# Patient Record
Sex: MALE | Race: WHITE | NOT HISPANIC OR LATINO | URBAN - METROPOLITAN AREA
[De-identification: names, ages, dates, MRNs, and addresses within clinical notes are randomized per-mention and may not be internally consistent; named-entity substitution may affect disease eponyms.]

---

## 2019-12-23 PROBLEM — Z00.00 ENCOUNTER FOR PREVENTIVE HEALTH EXAMINATION: Status: ACTIVE | Noted: 2019-12-23

## 2020-01-13 ENCOUNTER — EMERGENCY (EMERGENCY)
Facility: HOSPITAL | Age: 55
LOS: 1 days | End: 2020-01-13
Admitting: EMERGENCY MEDICINE
Payer: COMMERCIAL

## 2020-01-13 PROCEDURE — 73030 X-RAY EXAM OF SHOULDER: CPT | Mod: 26,RT

## 2020-01-13 PROCEDURE — 99285 EMERGENCY DEPT VISIT HI MDM: CPT

## 2020-01-13 PROCEDURE — 71046 X-RAY EXAM CHEST 2 VIEWS: CPT | Mod: 26

## 2020-01-14 ENCOUNTER — APPOINTMENT (OUTPATIENT)
Dept: CARDIOLOGY | Facility: CLINIC | Age: 55
End: 2020-01-14
Payer: COMMERCIAL

## 2020-01-14 VITALS
OXYGEN SATURATION: 97 % | DIASTOLIC BLOOD PRESSURE: 70 MMHG | HEIGHT: 76.5 IN | BODY MASS INDEX: 30.37 KG/M2 | RESPIRATION RATE: 17 BRPM | HEART RATE: 80 BPM | SYSTOLIC BLOOD PRESSURE: 146 MMHG | WEIGHT: 252 LBS

## 2020-01-14 DIAGNOSIS — R07.89 OTHER CHEST PAIN: ICD-10-CM

## 2020-01-14 DIAGNOSIS — Z83.3 FAMILY HISTORY OF DIABETES MELLITUS: ICD-10-CM

## 2020-01-14 DIAGNOSIS — Z78.9 OTHER SPECIFIED HEALTH STATUS: ICD-10-CM

## 2020-01-14 DIAGNOSIS — M25.511 PAIN IN RIGHT SHOULDER: ICD-10-CM

## 2020-01-14 DIAGNOSIS — M54.2 CERVICALGIA: ICD-10-CM

## 2020-01-14 DIAGNOSIS — Z80.0 FAMILY HISTORY OF MALIGNANT NEOPLASM OF DIGESTIVE ORGANS: ICD-10-CM

## 2020-01-14 PROCEDURE — 99203 OFFICE O/P NEW LOW 30 MIN: CPT

## 2020-01-14 NOTE — PHYSICAL EXAM
[General Appearance - Well Developed] : well developed [Normal Appearance] : normal appearance [Well Groomed] : well groomed [General Appearance - In No Acute Distress] : no acute distress [Normal Conjunctiva] : the conjunctiva exhibited no abnormalities [General Appearance - Well Nourished] : well nourished [Normal Oral Mucosa] : normal oral mucosa [Eyelids - No Xanthelasma] : the eyelids demonstrated no xanthelasmas [No Oral Pallor] : no oral pallor [No Oral Cyanosis] : no oral cyanosis [Murmurs] : no murmurs present [Heart Rate And Rhythm] : heart rate and rhythm were normal [Heart Sounds] : normal S1 and S2 [Edema] : no peripheral edema present [Respiration, Rhythm And Depth] : normal respiratory rhythm and effort [Auscultation Breath Sounds / Voice Sounds] : lungs were clear to auscultation bilaterally [Exaggerated Use Of Accessory Muscles For Inspiration] : no accessory muscle use [Gait - Sufficient For Exercise Testing] : the gait was sufficient for exercise testing [Abnormal Walk] : normal gait [FreeTextEntry1] : pain is reproducible upon palpation under the right pectoral region [Cyanosis, Localized] : no localized cyanosis [Nail Clubbing] : no clubbing of the fingernails [] : no rash [Skin Turgor] : normal skin turgor [Skin Color & Pigmentation] : normal skin color and pigmentation [Impaired Insight] : insight and judgment were intact [Affect] : the affect was normal [Memory Recent] : recent memory was not impaired

## 2020-01-14 NOTE — HISTORY OF PRESENT ILLNESS
[FreeTextEntry1] : 54 year old male, retired , no PMHx, developed chest pain yesterday while cooking dinner. It is right sided made worse with movement and breathing in. Also made worse with palpation of the affected area and moving his arms across his chest. It is still ongoing. He has an injured right shoulder. Has associated right neck soreness as well. The pain is described as sharp. Has some mild SOB with it, however hurts when he takes a breath in. Went to [facility] ED yesterday. Negative troponin, normal chest X-Ray and ECG. Patient is very active. Exercised by going to they gym and doing bike, treadmill, lifts weights. Doesn't recall a particular injury. Hasn't lifted weights last few days. No exertional chest pain or SOB.

## 2020-01-14 NOTE — DISCUSSION/SUMMARY
[FreeTextEntry1] : 1. Chest Pain: musculoskeletal in origin. Pain is reproducible upon palpating the affected area. Recommend NSAIDs and rest. Patient should lift weights for at least 2 weeks until pain resolves. No need for cardiac testing at this point in time. \par \par Patient can follow up in 3 months.

## 2020-01-14 NOTE — REVIEW OF SYSTEMS
[Shortness Of Breath] : no shortness of breath [Dyspnea on exertion] : not dyspnea during exertion [Palpitations] : no palpitations [Chest Pain] : chest pain [Lower Ext Edema] : no extremity edema [see HPI] : see HPI [Negative] : Heme/Lymph

## 2020-04-06 ENCOUNTER — APPOINTMENT (OUTPATIENT)
Dept: CARDIOLOGY | Facility: CLINIC | Age: 55
End: 2020-04-06

## 2022-06-14 ENCOUNTER — EMERGENCY (EMERGENCY)
Facility: HOSPITAL | Age: 57
LOS: 1 days | Discharge: HOME | End: 2022-06-14
Admitting: EMERGENCY MEDICINE
Payer: COMMERCIAL

## 2022-06-14 DIAGNOSIS — R10.31 RIGHT LOWER QUADRANT PAIN: ICD-10-CM

## 2022-06-14 DIAGNOSIS — K57.32 DIVERTICULITIS OF LARGE INTESTINE WITHOUT PERFORATION OR ABSCESS WITHOUT BLEEDING: ICD-10-CM

## 2022-06-14 DIAGNOSIS — R10.33 PERIUMBILICAL PAIN: ICD-10-CM

## 2022-06-14 DIAGNOSIS — R10.11 RIGHT UPPER QUADRANT PAIN: ICD-10-CM

## 2022-06-14 PROCEDURE — 99285 EMERGENCY DEPT VISIT HI MDM: CPT

## 2022-06-15 PROCEDURE — 74176 CT ABD & PELVIS W/O CONTRAST: CPT | Mod: 26,ME

## 2022-06-15 PROCEDURE — G1004: CPT

## 2022-06-15 PROCEDURE — 76705 ECHO EXAM OF ABDOMEN: CPT | Mod: 26

## 2024-03-16 ENCOUNTER — RESULT REVIEW (OUTPATIENT)
Age: 59
End: 2024-03-16

## 2024-03-16 ENCOUNTER — TRANSCRIPTION ENCOUNTER (OUTPATIENT)
Age: 59
End: 2024-03-16

## 2024-03-17 ENCOUNTER — TRANSCRIPTION ENCOUNTER (OUTPATIENT)
Age: 59
End: 2024-03-17

## 2024-03-25 RX ORDER — KETOCONAZOLE 20 MG/G
2 CREAM TOPICAL
Qty: 30 | Refills: 0 | Status: DISCONTINUED | COMMUNITY
Start: 2020-01-03 | End: 2024-03-25

## 2024-03-25 RX ORDER — COLD-HOT PACK
EACH MISCELLANEOUS DAILY
Refills: 0 | Status: DISCONTINUED | COMMUNITY
End: 2024-03-25

## 2024-04-02 ENCOUNTER — APPOINTMENT (OUTPATIENT)
Dept: CARDIOLOGY | Facility: CLINIC | Age: 59
End: 2024-04-02

## 2024-04-03 ENCOUNTER — APPOINTMENT (OUTPATIENT)
Dept: NEUROLOGY | Facility: CLINIC | Age: 59
End: 2024-04-03
Payer: COMMERCIAL

## 2024-04-03 VITALS
SYSTOLIC BLOOD PRESSURE: 162 MMHG | WEIGHT: 248 LBS | BODY MASS INDEX: 29.89 KG/M2 | HEIGHT: 76.5 IN | RESPIRATION RATE: 14 BRPM | TEMPERATURE: 98 F | DIASTOLIC BLOOD PRESSURE: 82 MMHG | OXYGEN SATURATION: 98 % | HEART RATE: 75 BPM

## 2024-04-03 DIAGNOSIS — R41.3 OTHER AMNESIA: ICD-10-CM

## 2024-04-03 PROCEDURE — 99205 OFFICE O/P NEW HI 60 MIN: CPT

## 2024-04-03 RX ORDER — ASPIRIN 81 MG
81 TABLET, DELAYED RELEASE (ENTERIC COATED) ORAL DAILY
Refills: 0 | Status: ACTIVE | COMMUNITY

## 2024-04-03 RX ORDER — ATORVASTATIN CALCIUM 40 MG/1
40 TABLET, FILM COATED ORAL
Refills: 0 | Status: ACTIVE | COMMUNITY

## 2024-04-03 RX ORDER — LABETALOL HYDROCHLORIDE 100 MG/1
100 TABLET, FILM COATED ORAL
Refills: 0 | Status: ACTIVE | COMMUNITY

## 2024-04-03 RX ORDER — CLOPIDOGREL BISULFATE 75 MG/1
75 TABLET, FILM COATED ORAL DAILY
Qty: 30 | Refills: 2 | Status: ACTIVE | COMMUNITY
Start: 2024-04-03 | End: 1900-01-01

## 2024-04-03 RX ORDER — KETOCONAZOLE 20 MG/G
2 CREAM TOPICAL
Refills: 0 | Status: ACTIVE | COMMUNITY

## 2024-04-03 NOTE — ASSESSMENT
[FreeTextEntry1] : 58-year-old male with hypertension, hyperlipidemia, [id number] admission at [facility] [place] for transient anterograde memory loss with reported MRI being negative and discharged with diagnosis of possible TGA on aspirin and atorvastatin.  Upon further review of the MRI I note 2 punctate foci of diffusion restriction with positive ADC correlate in the left mesial temporal lobe/hippocampal region consistent with acute stroke etiology likely ESUS (embolic). There is additional small circular anomaly in the right caudate head region which is uncertain if it is chronic stroke versus a cystic lesion would clarify with contrast enhanced MRI for r/o slow growing tumor.  TTE in hospital was without acute findings.  PLAN: - Continue with ASA 81 daily - Start Plavix 75 mg daily x 3 months - Continue with atorvastatin 44 LDL 69 at the hospital. - c/w BP meds for now  -Obtain MRI brain with and without contrast - Will check MRA head/neck to evaluate for steno-occlusive disease intracranially and extracranially.   -Cardiology referral.  Patient is already scheduled to see Dr. Padron coming friday   - would need cardioembolic w/u - including MERYL   - prolonged cardiac monitoring such as ILR should be considered -Stroke education, counseling provided.  Importance of aggressive vascular risk factor modification emphasized. - Advised pt to keep BP diary TID x 3 weeks and extra measurements when feeling dizzy, lightheaded. bring to next appt and to cardio.  #Snoring - r/o DEIDRA - sleep referral given  RTC in 3 months or PRN sooner  Extensive education and counseling done as per my usual protocol - relevant to the neurological issues above. Patient's and wife's questions and concerns were addressed, they voiced understanding.  Total time spent on the day of the visit, including pre-visit and post-visit time was 62 minutes.

## 2024-04-03 NOTE — REASON FOR VISIT
[Post Hospitalization] : a post hospitalization visit [Spouse] : spouse [FreeTextEntry1] : confusional episode, TIA

## 2024-04-03 NOTE — PHYSICAL EXAM
[FreeTextEntry1] : NEUROLOGIC EXAM:  MENTAL STATUS: Alert and Oriented to person, place and time. Speech is fluent, without aphasia or dysarthria. Able to name, repeat and follow commands. Behavior and affect appropriate to situation.                        CRANIAL NERVES: CN 2:    Visual fields appear full to confrontation OU CN 3, 4, 6: Extraocular movements are intact. No nystagmus or ophthalmoplegia is evident. Pupils are equally round CN 5:     Facial sensation is intact to light touch in all 3 divisions CN 7:     Facial excursion is full and symmetric bilaterally.  MOTOR: Bilateral upper extremities are antigravity without orbiting or drift. bilateral lower extremities are full range of motion without drift.  SENSORY: Intact to light touch perception in all four extremities  COORD: Finger to nose testing without dysmetria bilaterally.  GAIT: Normal station and gait. [Over the Past 2 Weeks, Have You Felt Down, Depressed, or Hopeless?] : 1.) Over the past 2 weeks, have you felt down, depressed, or hopeless? No [Over the Past 2 Weeks, Have You Felt Little Interest or Pleasure Doing Things?] : 2.) Over the past 2 weeks, have you felt little interest or pleasure doing things? No

## 2024-04-04 ENCOUNTER — RESULT REVIEW (OUTPATIENT)
Age: 59
End: 2024-04-04

## 2024-04-05 ENCOUNTER — APPOINTMENT (OUTPATIENT)
Dept: CARDIOLOGY | Facility: CLINIC | Age: 59
End: 2024-04-05
Payer: COMMERCIAL

## 2024-04-05 VITALS
DIASTOLIC BLOOD PRESSURE: 86 MMHG | HEART RATE: 64 BPM | WEIGHT: 248 LBS | HEIGHT: 76 IN | OXYGEN SATURATION: 96 % | SYSTOLIC BLOOD PRESSURE: 150 MMHG | BODY MASS INDEX: 30.2 KG/M2

## 2024-04-05 DIAGNOSIS — Z79.899 OTHER LONG TERM (CURRENT) DRUG THERAPY: ICD-10-CM

## 2024-04-05 PROCEDURE — 99205 OFFICE O/P NEW HI 60 MIN: CPT

## 2024-04-05 PROCEDURE — 93228 REMOTE 30 DAY ECG REV/REPORT: CPT

## 2024-04-05 PROCEDURE — G2211 COMPLEX E/M VISIT ADD ON: CPT

## 2024-04-05 RX ORDER — AMLODIPINE BESYLATE 5 MG/1
5 TABLET ORAL
Qty: 180 | Refills: 3 | Status: ACTIVE | COMMUNITY
Start: 1900-01-01 | End: 1900-01-01

## 2024-04-05 NOTE — CARDIOLOGY SUMMARY
[de-identified] : 3/15/2024, NSR with PVC [de-identified] : 3/16/2024, LV EF 55-60%, normal LV diastolic function, mild MR, mild TR with estimated PASP of 39mmHg.

## 2024-04-05 NOTE — DISCUSSION/SUMMARY
[FreeTextEntry1] : 1. Ischemic CVA: thought to be embolic by neurology based upon imaging. Advising MERYL and 30 Day event monitor. Recommend Aspirin 81mg daily, Plavix 75mg daily, atorvastatin 40mg daily. Goal LDL less than 70 and goal BP less than 130/80.  2. Carotid Atherosclerosis: not hemodynamically significant. Continue Aspirin 81mg daily and atorvastatin 40mg daily.  3. HTN: on amlodipine 5mg daily and Labetalol 100mg BID (high risk medication with no signs of toxicity). Remains elevated. Advise increasing amlodipine to 5mg BID.  4. HLD: goal LDL less than 70. Continue atorvastatin 40mg daily.  Follow up in 6 weeks.

## 2024-04-05 NOTE — PHYSICAL EXAM
[Normal] : moves all extremities, no focal deficits, normal speech [de-identified] :  No carotid bruits auscultated bilaterally.

## 2024-04-05 NOTE — HISTORY OF PRESENT ILLNESS
[FreeTextEntry1] : 58 year old male presents after having ischemic CVA, middle March 2024. Patient was evaluated and treated at [facility] [place]. Patient was working out in the gym (bench pressing 250lbs) when suddenly a friend noticed he became very confused could not remember things. Taken to the ED. CT head in the ER did not reveal any acute findings. CTA raises a concern for possible carotid atherosclerosis of about 50% on the right however was a poor quality study technically. CTP was thought to be artifactual.

## 2024-04-12 ENCOUNTER — RESULT REVIEW (OUTPATIENT)
Age: 59
End: 2024-04-12

## 2024-04-18 ENCOUNTER — APPOINTMENT (OUTPATIENT)
Dept: MRI IMAGING | Facility: CLINIC | Age: 59
End: 2024-04-18
Payer: COMMERCIAL

## 2024-04-18 ENCOUNTER — APPOINTMENT (OUTPATIENT)
Dept: CARDIOLOGY | Facility: CLINIC | Age: 59
End: 2024-04-18
Payer: COMMERCIAL

## 2024-04-18 VITALS
DIASTOLIC BLOOD PRESSURE: 90 MMHG | WEIGHT: 248 LBS | OXYGEN SATURATION: 98 % | HEIGHT: 76 IN | SYSTOLIC BLOOD PRESSURE: 146 MMHG | HEART RATE: 70 BPM | BODY MASS INDEX: 30.2 KG/M2

## 2024-04-18 DIAGNOSIS — G93.0 CEREBRAL CYSTS: ICD-10-CM

## 2024-04-18 DIAGNOSIS — E78.00 PURE HYPERCHOLESTEROLEMIA, UNSPECIFIED: ICD-10-CM

## 2024-04-18 DIAGNOSIS — C44.90 UNSPECIFIED MALIGNANT NEOPLASM OF SKIN, UNSPECIFIED: ICD-10-CM

## 2024-04-18 DIAGNOSIS — I65.29 OCCLUSION AND STENOSIS OF UNSPECIFIED CAROTID ARTERY: ICD-10-CM

## 2024-04-18 DIAGNOSIS — I10 ESSENTIAL (PRIMARY) HYPERTENSION: ICD-10-CM

## 2024-04-18 PROCEDURE — 70546 MR ANGIOGRAPH HEAD W/O&W/DYE: CPT

## 2024-04-18 PROCEDURE — G2211 COMPLEX E/M VISIT ADD ON: CPT

## 2024-04-18 PROCEDURE — 99215 OFFICE O/P EST HI 40 MIN: CPT

## 2024-04-18 PROCEDURE — 70545 MR ANGIOGRAPHY HEAD W/DYE: CPT | Mod: 59

## 2024-04-18 NOTE — CARDIOLOGY SUMMARY
[de-identified] : 3/15/2024, NSR with PVC [de-identified] : 4/12/2024, MERYL: Small PFO  3/16/2024, LV EF 55-60%, normal LV diastolic function, mild MR, mild TR with estimated PASP of 39mmHg.

## 2024-04-18 NOTE — PHYSICAL EXAM
[Normal] : moves all extremities, no focal deficits, normal speech [de-identified] :  No carotid bruits auscultated bilaterally.

## 2024-04-18 NOTE — DISCUSSION/SUMMARY
[FreeTextEntry1] : 1. Ischemic CVA: thought to be embolic by neurology based upon imaging. PFO confirmed on MERYL, 4/12/2024.  30 Day event monitor pending as well as sleep study ordered by neurology. Recommend Aspirin 81mg daily, Plavix 75mg daily, atorvastatin 40mg daily. Goal LDL less than 70 and goal BP less than 130/80.  2. Carotid Atherosclerosis: not hemodynamically significant. Continue Aspirin 81mg daily and atorvastatin 40mg daily.  3. HTN: on amlodipine 5mg BID and Labetalol 100mg BID (high risk medication with no signs of toxicity). Remains elevated.   4. HLD: goal LDL less than 70. Continue atorvastatin 40mg daily.  Will refer to Dr. Castanon for percutaneous closure of PFO consideration.  Follow up in 6 months. (4) rarely moist

## 2024-04-18 NOTE — HISTORY OF PRESENT ILLNESS
[FreeTextEntry1] : 58 year old male presents after having ischemic CVA, middle March 2024. Patient was evaluated and [place] [facility] [place] Patient was working out in the gym (bench pressing 250lbs) when suddenly a friend noticed he became very confused could not remember things. Taken to the ED. CT head in the ER did not reveal any acute findings. CTA raises a concern for possible carotid atherosclerosis of about 50% on the right however was a poor quality study technically. CTP was thought to be artifactual.

## 2024-04-22 ENCOUNTER — APPOINTMENT (OUTPATIENT)
Dept: MRI IMAGING | Facility: CLINIC | Age: 59
End: 2024-04-22
Payer: COMMERCIAL

## 2024-04-22 PROCEDURE — 70549 MR ANGIOGRAPH NECK W/O&W/DYE: CPT

## 2024-04-22 PROCEDURE — A9585: CPT | Mod: JW

## 2024-04-24 ENCOUNTER — APPOINTMENT (OUTPATIENT)
Dept: MRI IMAGING | Facility: CLINIC | Age: 59
End: 2024-04-24
Payer: COMMERCIAL

## 2024-04-24 PROCEDURE — 70553 MRI BRAIN STEM W/O & W/DYE: CPT

## 2024-05-06 ENCOUNTER — APPOINTMENT (OUTPATIENT)
Dept: CARDIOLOGY | Facility: CLINIC | Age: 59
End: 2024-05-06
Payer: COMMERCIAL

## 2024-05-06 VITALS
RESPIRATION RATE: 16 BRPM | SYSTOLIC BLOOD PRESSURE: 171 MMHG | HEART RATE: 70 BPM | HEIGHT: 76 IN | OXYGEN SATURATION: 99 % | DIASTOLIC BLOOD PRESSURE: 88 MMHG

## 2024-05-06 DIAGNOSIS — I63.432 CEREBRAL INFARCTION DUE TO EMBOLISM OF LEFT POSTERIOR CEREBRAL ARTERY: ICD-10-CM

## 2024-05-06 DIAGNOSIS — Q24.8 OTHER SPECIFIED CONGENITAL MALFORMATIONS OF HEART: ICD-10-CM

## 2024-05-06 DIAGNOSIS — Q21.12 PATENT FORAMEN OVALE: ICD-10-CM

## 2024-05-06 PROCEDURE — 99204 OFFICE O/P NEW MOD 45 MIN: CPT

## 2024-05-06 PROCEDURE — 93000 ELECTROCARDIOGRAM COMPLETE: CPT

## 2024-05-06 PROCEDURE — G2211 COMPLEX E/M VISIT ADD ON: CPT

## 2024-05-06 NOTE — DISCUSSION/SUMMARY
[FreeTextEntry1] : Patient's MERYL was reviewed.  A hypermobile atrial septum is present.  Bubble study reveals 20-30 bubbles crossing a PFO with bubble injection.  The results of his long term monitoring will be analyzed in the next two weeks.  Assuming no occult atrial fibrillation is found, will recommend PFO closure.  The procedure was explained to the patient, who agrees. [EKG obtained to assist in diagnosis and management of assessed problem(s)] : EKG obtained to assist in diagnosis and management of assessed problem(s)

## 2024-05-06 NOTE — HISTORY OF PRESENT ILLNESS
[FreeTextEntry1] : 58 year old man in generally good health. in march while lifting weights at the gym, he suffered a neurological event.  Initially thought to be global amnesia, review by neurology identified a probably embolic stroke.  Echocardiogram identified a PFO with right to left shunt on bubble study.  He is currently undergoiong long term monitoring for AF.  he has had no cardiac symptoms such as SOB, chest discomfort or aplpitations.

## 2024-05-08 ENCOUNTER — OUTPATIENT (OUTPATIENT)
Dept: OUTPATIENT SERVICES | Facility: HOSPITAL | Age: 59
LOS: 1 days | End: 2024-05-08
Payer: COMMERCIAL

## 2024-05-08 DIAGNOSIS — G47.33 OBSTRUCTIVE SLEEP APNEA (ADULT) (PEDIATRIC): ICD-10-CM

## 2024-05-08 PROCEDURE — 95810 POLYSOM 6/> YRS 4/> PARAM: CPT | Mod: 26

## 2024-05-08 PROCEDURE — 95810 POLYSOM 6/> YRS 4/> PARAM: CPT

## 2024-07-23 ENCOUNTER — APPOINTMENT (OUTPATIENT)
Dept: NEUROLOGY | Facility: CLINIC | Age: 59
End: 2024-07-23

## 2024-07-26 ENCOUNTER — APPOINTMENT (OUTPATIENT)
Dept: PULMONOLOGY | Facility: CLINIC | Age: 59
End: 2024-07-26
Payer: COMMERCIAL

## 2024-07-26 VITALS
WEIGHT: 250 LBS | SYSTOLIC BLOOD PRESSURE: 132 MMHG | RESPIRATION RATE: 16 BRPM | BODY MASS INDEX: 30.44 KG/M2 | HEART RATE: 82 BPM | OXYGEN SATURATION: 96 % | HEIGHT: 76 IN | DIASTOLIC BLOOD PRESSURE: 80 MMHG

## 2024-07-26 DIAGNOSIS — G47.19 OTHER HYPERSOMNIA: ICD-10-CM

## 2024-07-26 DIAGNOSIS — G47.33 OBSTRUCTIVE SLEEP APNEA (ADULT) (PEDIATRIC): ICD-10-CM

## 2024-07-26 DIAGNOSIS — R06.83 SNORING: ICD-10-CM

## 2024-07-26 PROCEDURE — 99213 OFFICE O/P EST LOW 20 MIN: CPT

## 2024-07-26 NOTE — CONSULT LETTER
[Dear  ___] : Dear  [unfilled], [Consult Letter:] : I had the pleasure of evaluating your patient, [unfilled]. [Please see my note below.] : Please see my note below. [Consult Closing:] : Thank you very much for allowing me to participate in the care of this patient.  If you have any questions, please do not hesitate to contact me. [Sincerely,] : Sincerely, [DrJarrell  ___] : Dr. MEJIA

## 2024-07-26 NOTE — HISTORY OF PRESENT ILLNESS
[TextBox_4] : 7/26/24  50-year-old obese male Snoring  Excessive daytime somnolence HTN Embolic CVA (while bench pressing 250 lbs) - PFO (awaiting closure) Non-smoker  for [employer] in [facility] Claustrophobic Doesn't believe he could tolerate CPAP

## 2024-07-26 NOTE — PHYSICAL EXAM
[No Acute Distress] : no acute distress [Elongated Uvula] : elongated uvula [Enlarged Base of the Tongue] : enlarged base of the tongue [Retrognathia] : retrognathia [II] : Mallampati Class: II [Normal Appearance] : normal appearance [No Neck Mass] : no neck mass [Normal Rate/Rhythm] : normal rate/rhythm [Normal S1, S2] : normal s1, s2 [No Resp Distress] : no resp distress [Clear to Auscultation Bilaterally] : clear to auscultation bilaterally [No Clubbing] : no clubbing [No Cyanosis] : no cyanosis [No Edema] : no edema

## 2024-08-28 ENCOUNTER — TRANSCRIPTION ENCOUNTER (OUTPATIENT)
Age: 59
End: 2024-08-28

## 2024-08-28 ENCOUNTER — RESULT REVIEW (OUTPATIENT)
Age: 59
End: 2024-08-28

## 2024-08-28 ENCOUNTER — OUTPATIENT (OUTPATIENT)
Dept: OUTPATIENT SERVICES | Facility: HOSPITAL | Age: 59
LOS: 1 days | End: 2024-08-28
Payer: COMMERCIAL

## 2024-08-28 VITALS
RESPIRATION RATE: 17 BRPM | OXYGEN SATURATION: 98 % | SYSTOLIC BLOOD PRESSURE: 153 MMHG | DIASTOLIC BLOOD PRESSURE: 97 MMHG | HEART RATE: 59 BPM

## 2024-08-28 VITALS
OXYGEN SATURATION: 98 % | DIASTOLIC BLOOD PRESSURE: 94 MMHG | WEIGHT: 253.97 LBS | SYSTOLIC BLOOD PRESSURE: 153 MMHG | HEART RATE: 65 BPM | TEMPERATURE: 97 F | RESPIRATION RATE: 13 BRPM | HEIGHT: 76 IN

## 2024-08-28 DIAGNOSIS — Z98.890 OTHER SPECIFIED POSTPROCEDURAL STATES: Chronic | ICD-10-CM

## 2024-08-28 DIAGNOSIS — Q21.12 PATENT FORAMEN OVALE: ICD-10-CM

## 2024-08-28 LAB
ANION GAP SERPL CALC-SCNC: 10 MMOL/L — SIGNIFICANT CHANGE UP (ref 5–17)
BASOPHILS # BLD AUTO: 0.05 K/UL — SIGNIFICANT CHANGE UP (ref 0–0.2)
BASOPHILS NFR BLD AUTO: 0.7 % — SIGNIFICANT CHANGE UP (ref 0–2)
BLD GP AB SCN SERPL QL: SIGNIFICANT CHANGE UP
BUN SERPL-MCNC: 15.8 MG/DL — SIGNIFICANT CHANGE UP (ref 8–20)
CALCIUM SERPL-MCNC: 9.1 MG/DL — SIGNIFICANT CHANGE UP (ref 8.4–10.5)
CHLORIDE SERPL-SCNC: 107 MMOL/L — SIGNIFICANT CHANGE UP (ref 96–108)
CO2 SERPL-SCNC: 23 MMOL/L — SIGNIFICANT CHANGE UP (ref 22–29)
CREAT SERPL-MCNC: 0.98 MG/DL — SIGNIFICANT CHANGE UP (ref 0.5–1.3)
EGFR: 89 ML/MIN/1.73M2 — SIGNIFICANT CHANGE UP
EOSINOPHIL # BLD AUTO: 0.18 K/UL — SIGNIFICANT CHANGE UP (ref 0–0.5)
EOSINOPHIL NFR BLD AUTO: 2.4 % — SIGNIFICANT CHANGE UP (ref 0–6)
GLUCOSE SERPL-MCNC: 124 MG/DL — HIGH (ref 70–99)
HCT VFR BLD CALC: 44.4 % — SIGNIFICANT CHANGE UP (ref 39–50)
HGB BLD-MCNC: 15.1 G/DL — SIGNIFICANT CHANGE UP (ref 13–17)
IMM GRANULOCYTES NFR BLD AUTO: 0.4 % — SIGNIFICANT CHANGE UP (ref 0–0.9)
LYMPHOCYTES # BLD AUTO: 1.27 K/UL — SIGNIFICANT CHANGE UP (ref 1–3.3)
LYMPHOCYTES # BLD AUTO: 16.6 % — SIGNIFICANT CHANGE UP (ref 13–44)
MAGNESIUM SERPL-MCNC: 2.1 MG/DL — SIGNIFICANT CHANGE UP (ref 1.6–2.6)
MCHC RBC-ENTMCNC: 31.1 PG — SIGNIFICANT CHANGE UP (ref 27–34)
MCHC RBC-ENTMCNC: 34 GM/DL — SIGNIFICANT CHANGE UP (ref 32–36)
MCV RBC AUTO: 91.5 FL — SIGNIFICANT CHANGE UP (ref 80–100)
MONOCYTES # BLD AUTO: 0.48 K/UL — SIGNIFICANT CHANGE UP (ref 0–0.9)
MONOCYTES NFR BLD AUTO: 6.3 % — SIGNIFICANT CHANGE UP (ref 2–14)
NEUTROPHILS # BLD AUTO: 5.62 K/UL — SIGNIFICANT CHANGE UP (ref 1.8–7.4)
NEUTROPHILS NFR BLD AUTO: 73.6 % — SIGNIFICANT CHANGE UP (ref 43–77)
PLATELET # BLD AUTO: 232 K/UL — SIGNIFICANT CHANGE UP (ref 150–400)
POTASSIUM SERPL-MCNC: 4.6 MMOL/L — SIGNIFICANT CHANGE UP (ref 3.5–5.3)
POTASSIUM SERPL-SCNC: 4.6 MMOL/L — SIGNIFICANT CHANGE UP (ref 3.5–5.3)
RBC # BLD: 4.85 M/UL — SIGNIFICANT CHANGE UP (ref 4.2–5.8)
RBC # FLD: 12.6 % — SIGNIFICANT CHANGE UP (ref 10.3–14.5)
SODIUM SERPL-SCNC: 140 MMOL/L — SIGNIFICANT CHANGE UP (ref 135–145)
WBC # BLD: 7.63 K/UL — SIGNIFICANT CHANGE UP (ref 3.8–10.5)
WBC # FLD AUTO: 7.63 K/UL — SIGNIFICANT CHANGE UP (ref 3.8–10.5)

## 2024-08-28 PROCEDURE — 93325 DOPPLER ECHO COLOR FLOW MAPG: CPT

## 2024-08-28 PROCEDURE — 93325 DOPPLER ECHO COLOR FLOW MAPG: CPT | Mod: 26

## 2024-08-28 PROCEDURE — 86850 RBC ANTIBODY SCREEN: CPT

## 2024-08-28 PROCEDURE — C1759: CPT

## 2024-08-28 PROCEDURE — C1887: CPT

## 2024-08-28 PROCEDURE — C1766: CPT

## 2024-08-28 PROCEDURE — 83735 ASSAY OF MAGNESIUM: CPT

## 2024-08-28 PROCEDURE — 86900 BLOOD TYPING SEROLOGIC ABO: CPT

## 2024-08-28 PROCEDURE — 93308 TTE F-UP OR LMTD: CPT | Mod: 26

## 2024-08-28 PROCEDURE — 36415 COLL VENOUS BLD VENIPUNCTURE: CPT

## 2024-08-28 PROCEDURE — 99152 MOD SED SAME PHYS/QHP 5/>YRS: CPT

## 2024-08-28 PROCEDURE — C1894: CPT

## 2024-08-28 PROCEDURE — 80048 BASIC METABOLIC PNL TOTAL CA: CPT

## 2024-08-28 PROCEDURE — C1769: CPT

## 2024-08-28 PROCEDURE — 93005 ELECTROCARDIOGRAM TRACING: CPT

## 2024-08-28 PROCEDURE — 93662 INTRACARDIAC ECG (ICE): CPT

## 2024-08-28 PROCEDURE — C8924: CPT

## 2024-08-28 PROCEDURE — C1817: CPT

## 2024-08-28 PROCEDURE — 93580 TRANSCATH CLOSURE OF ASD: CPT

## 2024-08-28 PROCEDURE — 85025 COMPLETE CBC W/AUTO DIFF WBC: CPT

## 2024-08-28 PROCEDURE — 93321 DOPPLER ECHO F-UP/LMTD STD: CPT

## 2024-08-28 PROCEDURE — 93662 INTRACARDIAC ECG (ICE): CPT | Mod: 26

## 2024-08-28 PROCEDURE — 93010 ELECTROCARDIOGRAM REPORT: CPT

## 2024-08-28 PROCEDURE — 93321 DOPPLER ECHO F-UP/LMTD STD: CPT | Mod: 26

## 2024-08-28 PROCEDURE — 86901 BLOOD TYPING SEROLOGIC RH(D): CPT

## 2024-08-28 RX ORDER — CEFAZOLIN SODIUM 2 G/100ML
2000 INJECTION, SOLUTION INTRAVENOUS ONCE
Refills: 0 | Status: COMPLETED | OUTPATIENT
Start: 2024-08-28 | End: 2024-08-28

## 2024-08-28 RX ORDER — CEFAZOLIN SODIUM 2 G/100ML
2000 INJECTION, SOLUTION INTRAVENOUS ONCE
Refills: 0 | Status: DISCONTINUED | OUTPATIENT
Start: 2024-08-28 | End: 2024-08-28

## 2024-08-28 RX ORDER — ACETAMINOPHEN 325 MG/1
650 TABLET ORAL EVERY 6 HOURS
Refills: 0 | Status: DISCONTINUED | OUTPATIENT
Start: 2024-08-28 | End: 2024-09-11

## 2024-08-28 RX ORDER — SODIUM CHLORIDE 9 MG/ML
250 INJECTION INTRAMUSCULAR; INTRAVENOUS; SUBCUTANEOUS ONCE
Refills: 0 | Status: DISCONTINUED | OUTPATIENT
Start: 2024-08-28 | End: 2024-09-11

## 2024-08-28 RX ORDER — AMLODIPINE BESYLATE 10 MG/1
1 TABLET ORAL
Refills: 0 | DISCHARGE

## 2024-08-28 RX ORDER — SODIUM CHLORIDE 9 MG/ML
250 INJECTION INTRAMUSCULAR; INTRAVENOUS; SUBCUTANEOUS ONCE
Refills: 0 | Status: COMPLETED | OUTPATIENT
Start: 2024-08-28 | End: 2024-08-28

## 2024-08-28 RX ORDER — CHLORHEXIDINE GLUCONATE 40 MG/ML
1 SOLUTION TOPICAL ONCE
Refills: 0 | Status: DISCONTINUED | OUTPATIENT
Start: 2024-08-28 | End: 2024-09-11

## 2024-08-28 RX ORDER — ACETAMINOPHEN 325 MG/1
2 TABLET ORAL
Qty: 0 | Refills: 0 | DISCHARGE
Start: 2024-08-28

## 2024-08-28 RX ORDER — ASPIRIN 81 MG
0 TABLET, DELAYED RELEASE (ENTERIC COATED) ORAL
Refills: 0 | DISCHARGE

## 2024-08-28 RX ADMIN — SODIUM CHLORIDE 250 MILLILITER(S): 9 INJECTION INTRAMUSCULAR; INTRAVENOUS; SUBCUTANEOUS at 12:42

## 2024-08-28 RX ADMIN — CEFAZOLIN SODIUM 2000 MILLIGRAM(S): 2 INJECTION, SOLUTION INTRAVENOUS at 16:32

## 2024-08-28 NOTE — DISCHARGE NOTE PROVIDER - CARE PROVIDER_API CALL
Juma Padron  Cardiology  [address] [place] [address]  Phone: [phone]  Fax: [phone]  Established Patient  Follow Up Time: 1 week    Benjamin Castanon  Interventional Cardiology  [address] [place] [address]  Phone: [phone]  Fax: [phone]  Established Patient  Follow Up Time: 1 week

## 2024-08-28 NOTE — DISCHARGE NOTE PROVIDER - NSDCDCMDCOMP_GEN_ALL_CORE
[birth year]    [place] [id number]          Dear Dorita Knapp,    According to our records, you are due for your annual mammography screening. Please contact our office to obtain a mammogram order.  If this letter has been sent i
This document is complete and the patient is ready for discharge.

## 2024-08-28 NOTE — H&P PST ADULT - NSICDXFAMILYHX_GEN_ALL_CORE_FT
FAMILY HISTORY:  Father  Still living? Unknown  Family history of diabetes mellitus (DM), Age at diagnosis: Age Unknown  FH: congestive heart failure, Age at diagnosis: 61-70  FH: myocardial infarction, Age at diagnosis: 61-70

## 2024-08-28 NOTE — H&P PST ADULT - NSICDXPASTMEDICALHX_GEN_ALL_CORE_FT
PAST MEDICAL HISTORY:  CVA (cerebral vascular accident)     HLD (hyperlipidemia)     HTN (hypertension)     Skin cancer

## 2024-08-28 NOTE — H&P PST ADULT - HISTORY OF PRESENT ILLNESS
59M PMH HTN; HLD; Skin Cancer in generally good health suffered a neurologic event while lifting weights at the gym. Initially thought to be global amnesia, review by neurology team identified a probable embolic CVA. MERYL performed 4/12/24 revealing LVEF 55-60%; agitated saline reveals bubbles in the left heart consistent with small PFO; mild MR. He is currently undergoing long term monitoring for AF. He denies cardiac symptoms such as chest discomfort, shortness of breath, palpitations. He presents today at [facility] for PFO Closure with Dr. Jonnathan Castanon.     Symptoms:        Angina (Class): n/a       Ischemic Symptoms: n/a     Heart Failure:        Systolic/Diastolic/Combined: n/a       NYHA Class (within 2 weeks): n/a    Assessment of LVEF (Must be within 6 months):       EF: 55-60%       Assessed by: MERYL        Date: 4/12/24    Prior Cardiac Interventions:       PCI's (Date, Stents, Vessels): n/a       CABG (Date, Grafts): n/a    Noninvasive Testing:   Stress Test: Date: n/a    Echo (Date, Findings): MERYL performed 4/12/24 revealing LVEF 55-60%; agitated saline reveals bubbles in the left heart consistent with small PFO; mild MR.    Antianginal Therapies:        Beta Blockers:  n/a       Calcium Channel Blockers: n/a       Long Acting Nitrates: n/a       Ranexa: n/a      Associated Risk Factors:        Cerebrovascular Disease: yes CVA       Chronic Lung Disease: N/A       Peripheral Arterial Disease: N/A       Chronic Kidney Disease (if yes, what is GFR): N/A       Uncontrolled Diabetes (if yes, what is HgbA1C or FBS): N/A       Poorly Controlled Hypertension (if yes, what is SBP): N/A       Morbid Obesity (if yes, what is BMI): N/A       History of Recent Ventricular Arrhythmia: N/A       Inability to Ambulate Safely: N/A       Need for Therapeutic Anticoagulation: N/A       Antiplatelet or Contrast Allergy: N/A     59M PMH Former Smoker; HTN; HLD; Skin Cancer in generally good health suffered a neurologic event while lifting weights at the gym. He presented to [facility], CTH was negative, CTA with possible caroid atherosclerosis <50% on right but poor study. CTP ?artifact. Initially thought to be global amnesia, review by neurology team MRI ->2 punctate foci of diffusion restriction with positive ADC correlate in the left mesial temporal lobe/hippocampal region consistent with acute stroke etiology likely ESUS (embolic).. MERYL performed 4/12/24 revealing LVEF 55-60%; agitated saline reveals bubbles in the left heart consistent with small PFO; mild MR. Patient was evaluated with Holter Monitor for 28 days revealing no afib; PAC and PVC burden <1%.. He denies cardiac symptoms such as chest discomfort, shortness of breath, palpitations. He presents today at [facility] for PFO Closure with Dr. Jonnathan Castanon.     Symptoms:        Angina (Class): n/a       Ischemic Symptoms: n/a     Heart Failure:        Systolic/Diastolic/Combined: n/a       NYHA Class (within 2 weeks): n/a    Assessment of LVEF (Must be within 6 months):       EF: 55-60%       Assessed by: MERYL        Date: 4/12/24    Prior Cardiac Interventions:       PCI's (Date, Stents, Vessels): n/a       CABG (Date, Grafts): n/a    Noninvasive Testing:   Stress Test: Date: n/a    Echo (Date, Findings): MERYL performed 4/12/24 revealing LVEF 55-60%; agitated saline reveals bubbles in the left heart consistent with small PFO; mild MR.    Antianginal Therapies:        Beta Blockers:  n/a       Calcium Channel Blockers: n/a       Long Acting Nitrates: n/a       Ranexa: n/a      Associated Risk Factors:        Cerebrovascular Disease: yes CVA       Chronic Lung Disease: N/A       Peripheral Arterial Disease: N/A       Chronic Kidney Disease (if yes, what is GFR): N/A       Uncontrolled Diabetes (if yes, what is HgbA1C or FBS): N/A       Poorly Controlled Hypertension (if yes, what is SBP): N/A       Morbid Obesity (if yes, what is BMI): N/A       History of Recent Ventricular Arrhythmia: N/A       Inability to Ambulate Safely: N/A       Need for Therapeutic Anticoagulation: N/A       Antiplatelet or Contrast Allergy: N/A     59M PMH Retired ; HTN; HLD; Skin Cancer; hernia surgery; left wrist brachial artery injury s/p repair in generally good health suffered a neurologic event in March while lifting weights at the gym. He presented to [facility] [place], CTH was negative, CTA with possible caroid atherosclerosis <50% on right but poor study. CTP ?artifact. Initially thought to be global amnesia, review by neurology team MRI ->2 punctate foci of diffusion restriction with positive ADC correlate in the left mesial temporal lobe/hippocampal region consistent with acute stroke etiology likely ESUS (embolic).. MERYL performed 4/12/24 revealing LVEF 55-60%; agitated saline reveals bubbles in the left heart consistent with small PFO; mild MR. Patient was evaluated with Holter Monitor for 28 days revealing no afib; PAC and PVC burden <1%.. He denies further neurologic episodes except he does have occasional headaches and tingling sensation in his head. He denies cardiac symptoms such as chest discomfort, shortness of breath, palpitations. He reports he is compliant with his medications and takes his aspirin and plavix daily. He presents today at [facility] for PFO Closure with Dr. Jonnathan Castanon.     Symptoms:        Angina (Class): n/a       Ischemic Symptoms: n/a     Heart Failure:        Systolic/Diastolic/Combined: n/a       NYHA Class (within 2 weeks): n/a    Assessment of LVEF (Must be within 6 months):       EF: 55-60%       Assessed by: MERYL        Date: 4/12/24    Prior Cardiac Interventions:       PCI's (Date, Stents, Vessels): n/a       CABG (Date, Grafts): n/a    Noninvasive Testing:   Stress Test: Date: n/a    Echo (Date, Findings): MERYL performed 4/12/24 revealing LVEF 55-60%; agitated saline reveals bubbles in the left heart consistent with small PFO; mild MR.    Antianginal Therapies:        Beta Blockers:  n/a       Calcium Channel Blockers: n/a       Long Acting Nitrates: n/a       Ranexa: n/a      Associated Risk Factors:        Cerebrovascular Disease: yes CVA       Chronic Lung Disease: N/A       Peripheral Arterial Disease: N/A       Chronic Kidney Disease (if yes, what is GFR): N/A       Uncontrolled Diabetes (if yes, what is HgbA1C or FBS): N/A       Poorly Controlled Hypertension (if yes, what is SBP): N/A       Morbid Obesity (if yes, what is BMI): N/A       History of Recent Ventricular Arrhythmia: N/A       Inability to Ambulate Safely: N/A       Need for Therapeutic Anticoagulation: N/A       Antiplatelet or Contrast Allergy: N/A

## 2024-08-28 NOTE — DISCHARGE NOTE PROVIDER - NSDCFUSCHEDAPPT_GEN_ALL_CORE_FT
Juma Padron  [facility] Physician [facility]  CARDIOLOGY [address]  Scheduled Appointment: 10/17/2024

## 2024-08-28 NOTE — H&P PST ADULT - OTHER CARE PROVIDERS
Cardiologist: Dr. Benjamin Castanon Cardiologist: Dr. Padron; Interventional Cardiologist: Dr. Benjamin Castanon

## 2024-08-28 NOTE — CHART NOTE - NSCHARTNOTEFT_GEN_A_CORE
Now s/p PFO Closure via RFV with Dr. Alexander Lg, pt tolerated procedure well. Pt arrived to recovery in NAD and HDS, RFV access site stable with 8 Kiswahili venous sheath x2 in place, no bleed/hematoma, distal pulse +2, RLE remains acyanotic; warm to touch; motor/sensory function intact  Intraprocedurally: Lidocaine 1% 5ml; Midazolam 1mg IV; Fentanyl 50mcg IV; Cefazolin 2gram IV; Bivalirudin 17ml IV  Findings: PFO s/p PFO Closure with 25MM Occluder (PRELIMINARY VERBAL REPORT; PENDING OFFICIAL REPORT)  Post procedure patient denies chest pain, chest pressure; shortness of breath, palpitations, dizziness, groin pain, back pain or thigh pain.       Plan:  -Formal cath report pending  -Post procedure management/monitoring per protocol  -Access site precautions  -HOB FLAT WITH RLE STRAIGHT WHILE RIGHT GROIN SHEATHS IN PLACE. RN TO CHECK ACT at 12 NOON  -Will remove VENOUS SHEATHS IF ACT <180  -NS 0.9% 250ml/hr x 1 bolus: post procedure SANA ppx   -Repeat ECG if any clinical indication or change on tele  -Continue current medical therapy  -Dual anti platelet therapy with aspirin/plavix   -Cont statin therapy with Lipitor 40mg po qHS   -Educated regarding strict adherence with DAPT   -Educated regarding post procedure management and care  -Discussed the importance of RF modification  -F/U outpt in 1-2 weeks with Cardiologist Dr. Benjamin Castanon  -TTE in 4 hours to evaluate for effusion and placement of PFO closure  -DISPO: Plan for D/C this evening if remains HDS, ECG and labs in am stable and without complications and if right groin remains hemodynamically stable

## 2024-08-28 NOTE — DISCHARGE NOTE PROVIDER - PROVIDER TOKENS
PROVIDER:[TOKEN:[[id number]],FOLLOWUP:[1 week],ESTABLISHEDPATIENT:[T]],PROVIDER:[TOKEN:[[id number]],FOLLOWUP:[1 week],ESTABLISHEDPATIENT:[T]]

## 2024-08-28 NOTE — DISCHARGE NOTE PROVIDER - NSDCCPTREATMENT_GEN_ALL_CORE_FT
PRINCIPAL PROCEDURE  Procedure: Transcatheter closure of patent foramen ovale (PFO)  Findings and Treatment: -You had a PFO CLOSURE (25mm Talisman Occluder) with Dr. Castanon today on 8/28/24. Dr. Castanon accessed your right femoral vein (right groin) during the procedure. That is the vein in your right groin.   -Please continue to monitor your right groin when you go home. If you develop any bleeding, lay down where you are and apply direct firm pressure until the bleeding stops. If the bleeding is excessive, please call 911.   -If heavy bleeding or large lumps form, hold pressure at the spot and come to the Emergency Room.  -No submerging the leg in water for 48 hours. This includes hot tubs, swimming pools and jacuzzis.  You may start showering tomorrow on 8/29. Prior to showering, remove dressing from right groin. Shower with warm water and soap. Pat dry with towel. You may place a bandaid over the site. change the bandaid every day.   -No heavy lifting greater than 10lbs x 5days..  -You may walk indoors/ outdoors as tolerated. No strenuous exercise, gym, sports or heavy lifting x5 days. Please avoid stairs x 48 hours.   -Please return to nearest ED if you develop any fever, chills, drainage from the incision site, or any change of temperature, color, sensation of the affected extremity.  -Call your doctor for any bleeding, swelling, loss of sensation in the leg or foot, or toes turning blue.  -Please return to nearest ED if you develop any chest pain, chest pressure, shortness of breath, jaw pain, pain radiating down the arm, palpitations, dizziness, palpitations, abdominal complaints including abdominal pain, nausea and vomiting.   -Please tell your doctors/dentist prior to having any dental or surgical procedure. Antibiotic prophylaxis is recommended for 6 months after PFO closure to prevent infection.   -Please follow up with your cardiologist in 1-2 weeks

## 2024-08-28 NOTE — H&P PST ADULT - ASSESSMENT
Impression:  59M PMH HTN; HLD; Skin Cancer, recent CVA. MERYL performed 4/12/24 revealing LVEF 55-60%; agitated saline reveals bubbles in the left heart consistent with small PFO; mild MR. He is currently undergoing long term monitoring for AF. He denies cardiac symptoms such as chest discomfort, shortness of breath, palpitations. He presents today at [facility] for PFO Closure with Dr. Jonnathan Castanon.     Risk Assessments:  ASA:  Mallampati:  GFR:   Cr:  BRA:    Plan:  -plan for PFO Closure  -patient seen and examined  -confirmed appropriate NPO duration  -ECG and Labs reviewed  -Aspirin 81mg po pre-cath  -procedure discussed with patient; risks and benefits explained, questions answered  -consent obtained by attending IC  -0.9% NS 250cc bolus ordered to prevent SANA Impression:  59M PMH HTN; HLD; Skin Cancer, recent CVA. MERYL performed 4/12/24 revealing LVEF 55-60%; agitated saline reveals bubbles in the left heart consistent with small PFO; mild MR. He is currently undergoing long term monitoring for AF. He denies cardiac symptoms such as chest discomfort, shortness of breath, palpitations. He presents today at [facility] for PFO Closure with Dr. Jonnathan Castanon.     Risk Assessments:  ASA: 3  Mallampati: 2  GFR:  89  Cr: 0.98  BRA: 0.7%    Risks, benefits, and alternatives reviewed.  Risks including but not limited to MI, death, stroke, bleeding, infection, vessel injury, hematoma, renal failure, allergic reaction, urgent open heart surgery, restenosis and stent thrombosis were reviewed.  All questions answered.  Patient is agreeable to proceed.   Pt. assessed, appropriate for sedation, pt. educated regarding the plan for Versed/Fentanyl as needed.      Plan:  -plan for PFO Closure  -patient seen and examined  -confirmed appropriate NPO duration  -ECG and Labs reviewed  -Patient took Aspirin 81mg and plavix 75mg po prior to hospital arrival  -procedure discussed with patient; risks and benefits explained, questions answered  -consent obtained by attending IC  -0.9% NS 250cc bolus ordered to prevent SANA

## 2024-08-28 NOTE — PROCEDURE NOTE - ADDITIONAL PROCEDURE DETAILS
Right groin cleansed with chlorhexadine. Right groin femoral venous sheath removed and manual pressure applied. Hemostasis achieved immediately. Manual pressure held x 30 minutes. No active bleeding or groin hematoma noted. DSD and tegaderm applied to right groin site. Patient and wife encouraged for patient to remain with RLE straight and HOB flat.     PLAN:  -Please assess Vitals; groin checks and peripheral vascular checks q15 minutes x 4 times; then q 30 minutes x 2 times; then q1 hour x 2 times  -Please keep HOB flat and RLE straight x 2 hours. If right groin site stable, ok to raise HOB <30 degrees at 3 pm  -bedrest x 4 hours. If right groin site stable, ok to ambulate at 5 pm  -please notify interventional cardiology NP immediately if patient develops any active bleeding; groin site hematoma; or any change in color/temperature/sensation of RLE.

## 2024-08-28 NOTE — DISCHARGE NOTE PROVIDER - NSDCMRMEDTOKEN_GEN_ALL_CORE_FT
amLODIPine 5 mg oral tablet: 1 tab(s) orally 2 times a day  aspirin 81 mg oral tablet: orally once a day  atorvastatin 40 mg oral tablet: 1 tab(s) orally once a day (at bedtime)  labetalol 100 mg oral tablet: 1 tab(s) orally 2 times a day  Plavix 75 mg oral tablet: 1 tab(s) orally once a day   acetaminophen 325 mg oral tablet: 2 tab(s) orally every 6 hours As needed Mild Pain (1 - 3)  amLODIPine 5 mg oral tablet: 1 tab(s) orally 2 times a day  aspirin 81 mg oral tablet: orally once a day  atorvastatin 40 mg oral tablet: 1 tab(s) orally once a day (at bedtime)  labetalol 100 mg oral tablet: 1 tab(s) orally 2 times a day  Plavix 75 mg oral tablet: 1 tab(s) orally once a day

## 2024-08-28 NOTE — DISCHARGE NOTE PROVIDER - CARE PROVIDERS DIRECT ADDRESSES
[email or web address]

## 2024-08-28 NOTE — DISCHARGE NOTE PROVIDER - HOSPITAL COURSE
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 He presented to [facility], CTH was negative, CTA with possible caroid atherosclerosis <50% on right but poor study. CTP ?artifact. Initially thought to be global amnesia, review by neurology team MRI ->2 punctate foci of diffusion restriction with positive ADC correlate in the left mesial temporal lobe/hippocampal region consistent with acute stroke etiology likely ESUS (embolic).. MERYL performed 4/12/24 revealing LVEF 55-60%; agitated saline reveals bubbles in the left heart consistent with small PFO; mild MR. Patient was evaluated with Holter Monitor for 28 days revealing no afib; PAC and PVC burden <1%.. He denies further neurologic episodes except he does have occasional headaches and tingling sensation in his head. He denies cardiac symptoms such as chest discomfort, shortness of breath, palpitations. He reports he is compliant with his medications and takes his aspirin and plavix daily. He presents today at [facility] for PFO Closure with Dr. Jonnathan Castanon.       Now s/p PFO Closure via RFV with Dr. Benjamin Castanon, pt tolerated procedure well. Pt arrived to recovery in NAD and HDS, RFV access site stable with 8 [place] venous sheath x2 in place, no bleed/hematoma, distal pulse +2, RLE remains acyanotic; warm to touch; motor/sensory function intact  Intraprocedurally: Lidocaine 1% 5ml; Midazolam 1mg IV; Fentanyl 50mcg IV; Cefazolin 2gram IV; Bivalirudin 17ml IV  Findings: PFO s/p PFO Closure with 25MM Occluder (PRELIMINARY VERBAL REPORT; PENDING OFFICIAL REPORT)  Post procedure patient denies chest pain, chest pressure; shortness of breath, palpitations, dizziness, groin pain, back pain or thigh pain.     POST PROCEDURE TTE: ______      Plan:  -Formal cath report pending  -Post procedure management/monitoring per protocol  -Access site precautions  -HOB FLAT WITH RLE STRAIGHT WHILE RIGHT GROIN SHEATHS IN PLACE. RN TO CHECK ACT at 12 NOON  -Will remove VENOUS SHEATHS IF ACT <180  -NS 0.9% 250ml/hr x 1 bolus: post procedure SANA ppx   -Repeat ECG if any clinical indication or change on tele  -Continue current medical therapy  -Dual anti platelet therapy with aspirin/plavix   -Cont statin therapy with Lipitor 40mg po qHS   -Educated regarding strict adherence with DAPT   -Educated regarding post procedure management and care  -Discussed the importance of RF modification  -F/U outpt in 1-2 weeks with Cardiologist Dr. Alexander Lg  -TTE in 4 hours to evaluate for effusion and placement of PFO closure  -DISPO: Plan for D/C this evening if remains HDS, ECG and labs in am stable and without complications and if right groin remains hemodynamically stable.   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 He presented to [facility] [place], CTH was negative, CTA with possible caroid atherosclerosis <50% on right but poor study. CTP ?artifact. Initially thought to be global amnesia, review by neurology team MRI ->2 punctate foci of diffusion restriction with positive ADC correlate in the left mesial temporal lobe/hippocampal region consistent with acute stroke etiology likely ESUS (embolic).. MERYL performed 4/12/24 revealing LVEF 55-60%; agitated saline reveals bubbles in the left heart consistent with small PFO; mild MR. Patient was evaluated with Holter Monitor for 28 days revealing no afib; PAC and PVC burden <1%.. He denies further neurologic episodes except he does have occasional headaches and tingling sensation in his head. He denies cardiac symptoms such as chest discomfort, shortness of breath, palpitations. He reports he is compliant with his medications and takes his aspirin and plavix daily. He presents today at [facility] for PFO Closure with Dr. Jonnathan Castanon.       Now s/p PFO Closure via RFV with Dr. Benjamin Castanon, pt tolerated procedure well. Pt arrived to recovery in NAD and HDS, RFV access site stable with 8 [place] venous sheath x2 removed and manual pull, no bleed/hematoma, distal pulse +2, RLE remains acyanotic; warm to touch; motor/sensory function intact  Intraprocedurally: Lidocaine 1% 5ml; Midazolam 1mg IV; Fentanyl 50mcg IV; Cefazolin 2gram IV; Bivalirudin 17ml IV  Findings: PFO s/p PFO Closure with 25MM Occluder (PRELIMINARY VERBAL REPORT; PENDING OFFICIAL REPORT)  Post procedure patient denies chest pain, chest pressure; shortness of breath, palpitations, dizziness, groin pain, back pain or thigh pain.     POST PROCEDURE TTE: < from: TTE Limited W or WO Ultrasound Enhancing Agent (08.28.24 @ 14:44) >    CONCLUSIONS:      1. Mild left ventricular hypertrophy.   2. Left ventricular cavity is normal in size. Left ventricular systolic function is normal with an ejection fraction visually estimated at 60 to 65 %. There are no regional wall motion abnormalities seen.   3. The left ventricular diastolic function is indeterminate, with normal left ventricular filling pressure.   4. Mildly enlarged right ventricular cavity size and normal right ventricular systolic function.   5. The right atrium is mildly dilated.   6. Left atrium is normal in size.   7. Trileaflet aortic valve with normal systolic excursion.   8. Thickened mitral valve leaflets.   9. Mild mitral regurgitation.  10. Trace tricuspid regurgitation.  11. Estimated pulmonary artery systolic pressure is 27 mmHg, normal pulmonary artery pressure.  12. Interatrial septal occluder device visualized, no shunting by color doppler on difficult images.  13. No pericardial effusion seen.    < end of copied text >          Plan:  -Formal cath report pending  -Post procedure management/monitoring per protocol  -Access site precautions  -NS 0.9% 250ml/hr x 1 bolus: post procedure SANA ppx   -Repeat ECG if any clinical indication or change on tele  -Continue current medical therapy  -Dual anti platelet therapy with aspirin/plavix   -Cont statin therapy with Lipitor 40mg po qHS   -Educated regarding strict adherence with DAPT   -Educated regarding post procedure management and care  -Discussed the importance of RF modification  -F/U outpt in 1-2 weeks with Cardiologist Dr. Alexander [place]  -TTE results reviewed normal EF; no pericardial effusion; Interatrial septal occluder device visualized, no shunting by color doppler on difficult images  -DISPO: Plan for D/C this evening if remains HDS, ECG and labs in am stable and without complications and if right groin remains hemodynamically stable.

## 2024-08-28 NOTE — DISCHARGE NOTE NURSING/CASE MANAGEMENT/SOCIAL WORK - PATIENT PORTAL LINK FT
You can access the FollowMyHealth Patient Portal offered by [facility] by registering at the following website: [email or web address]. By joining [email or web address] FollowMyHealth portal, you will also be able to view your health information using other applications (apps) compatible with our system.

## 2024-08-28 NOTE — DISCHARGE NOTE PROVIDER - NSDCCPCAREPLAN_GEN_ALL_CORE_FT
PRINCIPAL DISCHARGE DIAGNOSIS  Diagnosis: PFO (patent foramen ovale)  Assessment and Plan of Treatment: A patent foramen ovale (PFO) is a small opening between the two upper chambers of the heart, the right and the left atrium. Usually this closes soon after birth, but in 20-34% of the population, this does not close. A PFO can present a risk for stroke and closure of a PFO has been shown to reduce to risk of recurrent stroke.   -You had your PFO closed today with Dr. Castanon. He closed with a 25MM TALISMAN OCCLUDER.  Go to the ED with any acute onset of chest pain, palpitations, shortness of breath or dizziness or any acute onset of visual changes, difficulty with speech or swallowing, numbness/tingling/weakness on one side, facial droop or numbness, dizziness or change in mental status.  Do NOT miss a dose or stop taking your Aspirin and Plavix (clopidogrel),  If anyone tells you to stop these medications, speak to your cardiologist immediately.  No heavy lifting, driving, sex, tub baths, swimming, or any activity that submerges the lower half of the body in water for 48 hours.  Limited walking and stairs for 48 hours.    Change the bandaid after 24 hours and every 24 hours after that.  Keep the puncture site dry and covered with a bandaid until a scab forms.    Observe the site frequently.  If bleeding or a large lump (the size of a golf ball or bigger) occurs lie flat, apply continuous direct pressure just above the puncture site for at least 10 minutes, and notify your physician immediately.  If the bleeding cannot be controlled, call 911 immediately for assistance.  Notify your physician of pain, swelling or any drainage.    Notify your physician immediately if coldness, numbness, discoloration or pain in your foot occurs.

## 2024-08-29 DIAGNOSIS — Q21.12 PATENT FORAMEN OVALE: ICD-10-CM

## 2024-09-04 PROBLEM — C44.90 UNSPECIFIED MALIGNANT NEOPLASM OF SKIN, UNSPECIFIED: Chronic | Status: ACTIVE | Noted: 2024-08-28

## 2024-09-04 PROBLEM — I63.9 CEREBRAL INFARCTION, UNSPECIFIED: Chronic | Status: ACTIVE | Noted: 2024-08-28

## 2024-09-04 PROBLEM — I10 ESSENTIAL (PRIMARY) HYPERTENSION: Chronic | Status: ACTIVE | Noted: 2024-08-28

## 2024-09-04 PROBLEM — E78.5 HYPERLIPIDEMIA, UNSPECIFIED: Chronic | Status: ACTIVE | Noted: 2024-08-28

## 2024-09-16 ENCOUNTER — APPOINTMENT (OUTPATIENT)
Dept: CARDIOLOGY | Facility: CLINIC | Age: 59
End: 2024-09-16
Payer: COMMERCIAL

## 2024-09-16 VITALS
SYSTOLIC BLOOD PRESSURE: 140 MMHG | HEIGHT: 76 IN | OXYGEN SATURATION: 99 % | BODY MASS INDEX: 30.84 KG/M2 | RESPIRATION RATE: 16 BRPM | DIASTOLIC BLOOD PRESSURE: 90 MMHG | WEIGHT: 253.25 LBS

## 2024-09-16 DIAGNOSIS — Z87.74 PERSONAL HISTORY OF (CORRECTED) CONGENITAL MALFORMATIONS OF HEART AND CIRCULATORY SYSTEM: ICD-10-CM

## 2024-09-16 DIAGNOSIS — Q21.12 PATENT FORAMEN OVALE: ICD-10-CM

## 2024-09-16 DIAGNOSIS — Q24.8 OTHER SPECIFIED CONGENITAL MALFORMATIONS OF HEART: ICD-10-CM

## 2024-09-16 PROCEDURE — 93000 ELECTROCARDIOGRAM COMPLETE: CPT

## 2024-09-16 PROCEDURE — G2211 COMPLEX E/M VISIT ADD ON: CPT | Mod: NC

## 2024-09-16 PROCEDURE — 99214 OFFICE O/P EST MOD 30 MIN: CPT | Mod: 25

## 2024-09-16 NOTE — DISCUSSION/SUMMARY
[FreeTextEntry1] : Patient recuperating uneventfully s/p PFO closure.  contineu aspirin and Plavix.  Follow up TTE ordered. [EKG obtained to assist in diagnosis and management of assessed problem(s)] : EKG obtained to assist in diagnosis and management of assessed problem(s)

## 2024-09-16 NOTE — HISTORY OF PRESENT ILLNESS
[FreeTextEntry1] : Patient s/p PFO closure 8/2024 for cryptogenic stroke.  He is symptoms free and has gone back to his normal activities including going to the gym.  On aspirina and Plavix.

## 2024-10-17 ENCOUNTER — NON-APPOINTMENT (OUTPATIENT)
Age: 59
End: 2024-10-17

## 2024-10-17 ENCOUNTER — APPOINTMENT (OUTPATIENT)
Dept: CARDIOLOGY | Facility: CLINIC | Age: 59
End: 2024-10-17
Payer: COMMERCIAL

## 2024-10-17 VITALS
OXYGEN SATURATION: 96 % | SYSTOLIC BLOOD PRESSURE: 142 MMHG | HEART RATE: 74 BPM | DIASTOLIC BLOOD PRESSURE: 88 MMHG | BODY MASS INDEX: 31.42 KG/M2 | HEIGHT: 76 IN | WEIGHT: 258 LBS

## 2024-10-17 DIAGNOSIS — I63.432 CEREBRAL INFARCTION DUE TO EMBOLISM OF LEFT POSTERIOR CEREBRAL ARTERY: ICD-10-CM

## 2024-10-17 DIAGNOSIS — Z87.74 PERSONAL HISTORY OF (CORRECTED) CONGENITAL MALFORMATIONS OF HEART AND CIRCULATORY SYSTEM: ICD-10-CM

## 2024-10-17 DIAGNOSIS — I10 ESSENTIAL (PRIMARY) HYPERTENSION: ICD-10-CM

## 2024-10-17 DIAGNOSIS — E78.00 PURE HYPERCHOLESTEROLEMIA, UNSPECIFIED: ICD-10-CM

## 2024-10-17 DIAGNOSIS — Z79.899 OTHER LONG TERM (CURRENT) DRUG THERAPY: ICD-10-CM

## 2024-10-17 DIAGNOSIS — Q21.12 PATENT FORAMEN OVALE: ICD-10-CM

## 2024-10-17 PROCEDURE — 99215 OFFICE O/P EST HI 40 MIN: CPT

## 2024-10-17 PROCEDURE — G2211 COMPLEX E/M VISIT ADD ON: CPT | Mod: NC

## 2024-11-15 ENCOUNTER — APPOINTMENT (OUTPATIENT)
Dept: CARDIOLOGY | Facility: CLINIC | Age: 59
End: 2024-11-15
Payer: COMMERCIAL

## 2024-11-15 PROCEDURE — 93306 TTE W/DOPPLER COMPLETE: CPT

## 2025-01-07 ENCOUNTER — APPOINTMENT (OUTPATIENT)
Dept: CARDIOLOGY | Facility: CLINIC | Age: 60
End: 2025-01-07
Payer: COMMERCIAL

## 2025-01-07 ENCOUNTER — NON-APPOINTMENT (OUTPATIENT)
Age: 60
End: 2025-01-07

## 2025-01-07 VITALS
BODY MASS INDEX: 31.42 KG/M2 | SYSTOLIC BLOOD PRESSURE: 150 MMHG | HEIGHT: 76 IN | OXYGEN SATURATION: 98 % | DIASTOLIC BLOOD PRESSURE: 88 MMHG | WEIGHT: 258 LBS | HEART RATE: 83 BPM

## 2025-01-07 DIAGNOSIS — I10 ESSENTIAL (PRIMARY) HYPERTENSION: ICD-10-CM

## 2025-01-07 DIAGNOSIS — Z87.74 PERSONAL HISTORY OF (CORRECTED) CONGENITAL MALFORMATIONS OF HEART AND CIRCULATORY SYSTEM: ICD-10-CM

## 2025-01-07 PROCEDURE — G2211 COMPLEX E/M VISIT ADD ON: CPT | Mod: NC

## 2025-01-07 PROCEDURE — 99214 OFFICE O/P EST MOD 30 MIN: CPT

## 2025-01-07 PROCEDURE — 93000 ELECTROCARDIOGRAM COMPLETE: CPT

## 2025-01-20 ENCOUNTER — APPOINTMENT (OUTPATIENT)
Dept: PODIATRY | Facility: CLINIC | Age: 60
End: 2025-01-20
Payer: COMMERCIAL

## 2025-01-20 VITALS — HEIGHT: 76 IN | BODY MASS INDEX: 31.78 KG/M2 | WEIGHT: 261 LBS

## 2025-01-20 DIAGNOSIS — M79.89 OTHER SPECIFIED SOFT TISSUE DISORDERS: ICD-10-CM

## 2025-01-20 PROCEDURE — 99204 OFFICE O/P NEW MOD 45 MIN: CPT

## 2025-02-10 ENCOUNTER — APPOINTMENT (OUTPATIENT)
Dept: MRI IMAGING | Facility: CLINIC | Age: 60
End: 2025-02-10
Payer: COMMERCIAL

## 2025-02-10 PROCEDURE — A9585: CPT | Mod: JW

## 2025-02-10 PROCEDURE — 73720 MRI LWR EXTREMITY W/O&W/DYE: CPT | Mod: RT

## 2025-02-17 ENCOUNTER — APPOINTMENT (OUTPATIENT)
Dept: PODIATRY | Facility: CLINIC | Age: 60
End: 2025-02-17
Payer: COMMERCIAL

## 2025-02-17 DIAGNOSIS — M67.471 GANGLION, RIGHT ANKLE AND FOOT: ICD-10-CM

## 2025-02-17 PROCEDURE — 99214 OFFICE O/P EST MOD 30 MIN: CPT

## 2025-04-01 ENCOUNTER — NON-APPOINTMENT (OUTPATIENT)
Age: 60
End: 2025-04-01

## 2025-04-03 ENCOUNTER — APPOINTMENT (OUTPATIENT)
Dept: NEUROLOGY | Facility: CLINIC | Age: 60
End: 2025-04-03
Payer: COMMERCIAL

## 2025-04-03 DIAGNOSIS — R56.9 UNSPECIFIED CONVULSIONS: ICD-10-CM

## 2025-04-03 DIAGNOSIS — I63.432 CEREBRAL INFARCTION DUE TO EMBOLISM OF LEFT POSTERIOR CEREBRAL ARTERY: ICD-10-CM

## 2025-04-03 PROCEDURE — 99215 OFFICE O/P EST HI 40 MIN: CPT | Mod: 95

## 2025-04-18 ENCOUNTER — APPOINTMENT (OUTPATIENT)
Dept: CARDIOLOGY | Facility: CLINIC | Age: 60
End: 2025-04-18
Payer: COMMERCIAL

## 2025-04-18 VITALS
OXYGEN SATURATION: 98 % | HEART RATE: 74 BPM | SYSTOLIC BLOOD PRESSURE: 132 MMHG | HEIGHT: 76 IN | BODY MASS INDEX: 31.42 KG/M2 | DIASTOLIC BLOOD PRESSURE: 82 MMHG | WEIGHT: 258 LBS

## 2025-04-18 DIAGNOSIS — I10 ESSENTIAL (PRIMARY) HYPERTENSION: ICD-10-CM

## 2025-04-18 DIAGNOSIS — Z87.74 PERSONAL HISTORY OF (CORRECTED) CONGENITAL MALFORMATIONS OF HEART AND CIRCULATORY SYSTEM: ICD-10-CM

## 2025-04-18 DIAGNOSIS — E78.00 PURE HYPERCHOLESTEROLEMIA, UNSPECIFIED: ICD-10-CM

## 2025-04-18 DIAGNOSIS — Q21.12 PATENT FORAMEN OVALE: ICD-10-CM

## 2025-04-18 PROCEDURE — 99214 OFFICE O/P EST MOD 30 MIN: CPT

## 2025-04-18 PROCEDURE — G2211 COMPLEX E/M VISIT ADD ON: CPT | Mod: NC

## 2025-08-19 ENCOUNTER — APPOINTMENT (OUTPATIENT)
Dept: CARDIOLOGY | Facility: CLINIC | Age: 60
End: 2025-08-19
Payer: COMMERCIAL

## 2025-08-19 VITALS
HEART RATE: 74 BPM | WEIGHT: 257 LBS | OXYGEN SATURATION: 98 % | HEIGHT: 76 IN | DIASTOLIC BLOOD PRESSURE: 86 MMHG | BODY MASS INDEX: 31.29 KG/M2 | SYSTOLIC BLOOD PRESSURE: 140 MMHG

## 2025-08-19 DIAGNOSIS — Z87.74 PERSONAL HISTORY OF (CORRECTED) CONGENITAL MALFORMATIONS OF HEART AND CIRCULATORY SYSTEM: ICD-10-CM

## 2025-08-19 DIAGNOSIS — I63.432 CEREBRAL INFARCTION DUE TO EMBOLISM OF LEFT POSTERIOR CEREBRAL ARTERY: ICD-10-CM

## 2025-08-19 PROCEDURE — G2211 COMPLEX E/M VISIT ADD ON: CPT | Mod: NC

## 2025-08-19 PROCEDURE — 99214 OFFICE O/P EST MOD 30 MIN: CPT

## 2025-08-19 PROCEDURE — 93000 ELECTROCARDIOGRAM COMPLETE: CPT
